# Patient Record
Sex: FEMALE | Race: WHITE | NOT HISPANIC OR LATINO | Employment: OTHER | URBAN - METROPOLITAN AREA
[De-identification: names, ages, dates, MRNs, and addresses within clinical notes are randomized per-mention and may not be internally consistent; named-entity substitution may affect disease eponyms.]

---

## 2023-08-21 ENCOUNTER — HOSPITAL ENCOUNTER (OUTPATIENT)
Dept: DATA CONVERSION | Facility: HOSPITAL | Age: 78
End: 2023-08-21

## 2023-08-21 DIAGNOSIS — C50.412 MALIGNANT NEOPLASM OF UPPER-OUTER QUADRANT OF LEFT FEMALE BREAST (MULTI): ICD-10-CM

## 2023-12-18 DIAGNOSIS — C50.919 MALIGNANT NEOPLASM OF BREAST IN FEMALE, ESTROGEN RECEPTOR POSITIVE, UNSPECIFIED LATERALITY, UNSPECIFIED SITE OF BREAST (MULTI): Primary | ICD-10-CM

## 2023-12-18 DIAGNOSIS — Z17.0 MALIGNANT NEOPLASM OF BREAST IN FEMALE, ESTROGEN RECEPTOR POSITIVE, UNSPECIFIED LATERALITY, UNSPECIFIED SITE OF BREAST (MULTI): Primary | ICD-10-CM

## 2023-12-27 ENCOUNTER — APPOINTMENT (OUTPATIENT)
Dept: HEMATOLOGY/ONCOLOGY | Facility: CLINIC | Age: 78
End: 2023-12-27
Payer: MEDICARE

## 2024-01-03 ENCOUNTER — ANCILLARY PROCEDURE (OUTPATIENT)
Dept: RADIOLOGY | Facility: CLINIC | Age: 79
End: 2024-01-03
Payer: MEDICARE

## 2024-01-03 ENCOUNTER — APPOINTMENT (OUTPATIENT)
Dept: HEMATOLOGY/ONCOLOGY | Facility: CLINIC | Age: 79
End: 2024-01-03
Payer: MEDICARE

## 2024-01-03 DIAGNOSIS — C50.412 MALIGNANT NEOPLASM OF UPPER-OUTER QUADRANT OF LEFT FEMALE BREAST (MULTI): Primary | ICD-10-CM

## 2024-01-03 PROCEDURE — 71260 CT THORAX DX C+: CPT

## 2024-01-03 PROCEDURE — 2550000001 HC RX 255 CONTRASTS: Performed by: INTERNAL MEDICINE

## 2024-01-03 RX ADMIN — IOHEXOL 50 ML: 350 INJECTION, SOLUTION INTRAVENOUS at 13:42

## 2024-02-14 ENCOUNTER — APPOINTMENT (OUTPATIENT)
Dept: HEMATOLOGY/ONCOLOGY | Facility: CLINIC | Age: 79
End: 2024-02-14
Payer: MEDICARE

## 2024-02-14 PROBLEM — Z85.3 PERSONAL HISTORY OF MALIGNANT NEOPLASM OF BREAST: Status: ACTIVE | Noted: 2023-01-01

## 2024-02-14 PROBLEM — E11.22 TYPE 2 DIABETES MELLITUS WITH DIABETIC CHRONIC KIDNEY DISEASE (MULTI): Status: ACTIVE | Noted: 2023-05-25

## 2024-02-14 PROBLEM — I69.354 HEMIPLGA FOLLOWING CEREBRAL INFRC AFFECTING LEFT NONDOM SIDE (MULTI): Status: ACTIVE | Noted: 2023-01-01

## 2024-02-14 PROBLEM — I50.9 CONGESTIVE HEART FAILURE (MULTI): Status: ACTIVE | Noted: 2021-10-25

## 2024-02-14 PROBLEM — Z90.12 ACQUIRED ABSENCE OF LEFT BREAST AND NIPPLE: Status: ACTIVE | Noted: 2023-01-01

## 2024-02-14 PROBLEM — M54.50 CHRONIC LOW BACK PAIN: Status: ACTIVE | Noted: 2023-05-25

## 2024-02-14 PROBLEM — R06.00 DYSPNEA: Status: ACTIVE | Noted: 2024-02-14

## 2024-02-14 PROBLEM — M19.90 ARTHRITIS: Status: ACTIVE | Noted: 2024-02-14

## 2024-02-14 PROBLEM — M19.90 DEGENERATIVE JOINT DISEASE: Status: ACTIVE | Noted: 2024-02-14

## 2024-02-14 PROBLEM — H53.9 UNSPECIFIED VISUAL DISTURBANCE: Status: ACTIVE | Noted: 2023-05-25

## 2024-02-14 PROBLEM — I25.2 OLD MYOCARDIAL INFARCTION: Status: ACTIVE | Noted: 2023-01-01

## 2024-02-14 PROBLEM — R07.9 CHEST PAIN: Status: ACTIVE | Noted: 2024-02-14

## 2024-02-14 PROBLEM — M85.80 OSTEOPENIA: Status: ACTIVE | Noted: 2024-02-14

## 2024-02-14 PROBLEM — F01.50 VASCULAR DEMENTIA (MULTI): Status: ACTIVE | Noted: 2024-02-14

## 2024-02-14 PROBLEM — Z90.710 ACQUIRED ABSENCE OF BOTH CERVIX AND UTERUS: Status: ACTIVE | Noted: 2023-01-01

## 2024-02-14 PROBLEM — M48.02 SPINAL STENOSIS IN CERVICAL REGION: Status: ACTIVE | Noted: 2023-05-28

## 2024-02-14 PROBLEM — M19.90 UNSPECIFIED OSTEOARTHRITIS, UNSPECIFIED SITE: Status: ACTIVE | Noted: 2023-01-01

## 2024-02-14 PROBLEM — E11.9 DIABETES MELLITUS WITHOUT COMPLICATION (MULTI): Status: ACTIVE | Noted: 2023-05-28

## 2024-02-14 PROBLEM — E66.9 OBESITY, UNSPECIFIED: Status: ACTIVE | Noted: 2023-05-28

## 2024-02-14 PROBLEM — I63.9 CEREBROVASCULAR ACCIDENT (MULTI): Status: ACTIVE | Noted: 2024-02-14

## 2024-02-14 PROBLEM — M25.531 RIGHT WRIST PAIN: Status: ACTIVE | Noted: 2024-02-14

## 2024-02-14 PROBLEM — I25.10 ARTERIOSCLEROSIS OF CORONARY ARTERY: Status: ACTIVE | Noted: 2021-09-16

## 2024-02-14 PROBLEM — S83.92XD: Status: ACTIVE | Noted: 2023-05-28

## 2024-02-14 PROBLEM — H54.7 UNSPECIFIED VISUAL LOSS: Status: ACTIVE | Noted: 2023-01-01

## 2024-02-14 PROBLEM — N18.30 TYPE 2 DIABETES MELLITUS WITH STAGE 3 CHRONIC KIDNEY DISEASE, WITHOUT LONG-TERM CURRENT USE OF INSULIN (MULTI): Status: ACTIVE | Noted: 2020-09-17

## 2024-02-14 PROBLEM — R29.6 REPEATED FALLS: Status: ACTIVE | Noted: 2023-06-23

## 2024-02-14 PROBLEM — Z92.21 PERSONAL HISTORY OF ANTINEOPLASTIC CHEMOTHERAPY: Status: ACTIVE | Noted: 2023-01-01

## 2024-02-14 PROBLEM — E04.2 MULTINODULAR GOITER: Status: ACTIVE | Noted: 2024-02-14

## 2024-02-14 PROBLEM — R26.0 SENSORY ATAXIC GAIT: Status: ACTIVE | Noted: 2024-02-14

## 2024-02-14 PROBLEM — R41.3 MEMORY LOSS DUE TO MEDICAL CONDITION: Status: ACTIVE | Noted: 2024-02-14

## 2024-02-14 PROBLEM — I25.10 ATHEROSCLEROTIC HEART DISEASE OF NATIVE CORONARY ARTERY WITHOUT ANGINA PECTORIS: Status: ACTIVE | Noted: 2023-05-25

## 2024-02-14 PROBLEM — Z85.3 HX OF BREAST CANCER: Status: ACTIVE | Noted: 2023-05-28

## 2024-02-14 PROBLEM — I69.312: Status: ACTIVE | Noted: 2023-05-28

## 2024-02-14 PROBLEM — K59.09 OTHER CONSTIPATION: Status: ACTIVE | Noted: 2022-12-15

## 2024-02-14 PROBLEM — E11.40 NEUROPATHY, DIABETIC (MULTI): Status: ACTIVE | Noted: 2024-02-14
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
## 2024-02-14 PROBLEM — K21.9 GASTROESOPHAGEAL REFLUX DISEASE: Status: ACTIVE | Noted: 2024-02-14

## 2024-02-14 PROBLEM — I10 PRIMARY HYPERTENSION: Status: ACTIVE | Noted: 2022-09-20

## 2024-02-14 PROBLEM — R06.02 SOBOE (SHORTNESS OF BREATH ON EXERTION): Status: ACTIVE | Noted: 2024-02-14

## 2024-02-14 PROBLEM — F41.8 MIXED ANXIETY AND DEPRESSIVE DISORDER: Status: ACTIVE | Noted: 2024-02-14

## 2024-02-14 PROBLEM — Z51.81 ENCOUNTER FOR THERAPEUTIC DRUG LEVEL MONITORING: Status: ACTIVE | Noted: 2023-07-28

## 2024-02-14 PROBLEM — R53.1 ASTHENIA: Status: ACTIVE | Noted: 2024-02-14

## 2024-02-14 PROBLEM — M62.81 MUSCLE WEAKNESS (GENERALIZED): Status: ACTIVE | Noted: 2023-05-28

## 2024-02-14 PROBLEM — R93.89 ABNORMAL FINDINGS ON DIAGNOSTIC IMAGING OF OTHER SPECIFIED BODY STRUCTURES: Status: ACTIVE | Noted: 2023-05-17

## 2024-02-14 PROBLEM — K80.20 CHOLELITHIASIS: Status: ACTIVE | Noted: 2024-02-14

## 2024-02-14 PROBLEM — N18.30 CKD (CHRONIC KIDNEY DISEASE) STAGE 3, GFR 30-59 ML/MIN (MULTI): Status: ACTIVE | Noted: 2021-06-20

## 2024-02-14 PROBLEM — C34.90 MALIGNANT NEOPLASM OF LUNG (MULTI): Status: ACTIVE | Noted: 2023-07-31

## 2024-02-14 PROBLEM — Z79.02 LONG TERM (CURRENT) USE OF ANTITHROMBOTICS/ANTIPLATELETS: Status: ACTIVE | Noted: 2023-01-01

## 2024-02-14 PROBLEM — N18.9 CHRONIC KIDNEY DISEASE, UNSPECIFIED: Status: ACTIVE | Noted: 2023-05-25

## 2024-02-14 PROBLEM — Z99.3 DEPENDENCE ON WHEELCHAIR: Status: ACTIVE | Noted: 2023-05-25

## 2024-02-14 PROBLEM — Z79.84 LONG TERM (CURRENT) USE OF ORAL HYPOGLYCEMIC DRUGS: Status: ACTIVE | Noted: 2023-06-23

## 2024-02-14 PROBLEM — I89.0 LYMPHEDEMA, NOT ELSEWHERE CLASSIFIED: Status: ACTIVE | Noted: 2023-01-01

## 2024-02-14 PROBLEM — E11.22 TYPE 2 DIABETES MELLITUS WITH STAGE 3 CHRONIC KIDNEY DISEASE, WITHOUT LONG-TERM CURRENT USE OF INSULIN (MULTI): Status: ACTIVE | Noted: 2020-09-17

## 2024-02-14 PROBLEM — R10.13 DYSPEPSIA: Status: ACTIVE | Noted: 2024-02-14

## 2024-02-14 PROBLEM — I69.354 HEMIPLEGIA AND HEMIPARESIS FOLLOWING CEREBRAL INFARCTION AFFECTING LEFT NON-DOMINANT SIDE (MULTI): Status: ACTIVE | Noted: 2023-05-28

## 2024-02-14 PROBLEM — M25.462 EFFUSION, LEFT KNEE: Status: ACTIVE | Noted: 2023-05-28

## 2024-02-14 PROBLEM — I10 BENIGN ESSENTIAL HYPERTENSION: Status: ACTIVE | Noted: 2024-02-14

## 2024-02-14 PROBLEM — I69.311 MEMORY DEFICIT FOLLOWING CEREBRAL INFARCTION: Status: ACTIVE | Noted: 2023-01-01

## 2024-02-14 PROBLEM — R73.09 BLOOD GLUCOSE ABNORMAL: Status: ACTIVE | Noted: 2024-02-14

## 2024-02-14 PROBLEM — G89.29 CHRONIC LOW BACK PAIN: Status: ACTIVE | Noted: 2023-05-25

## 2024-02-14 PROBLEM — Z87.19 PERSONAL HISTORY OF OTHER DISEASES OF THE DIGESTIVE SYSTEM: Status: ACTIVE | Noted: 2022-12-15

## 2024-02-14 PROBLEM — N18.30 CHRONIC KIDNEY DISEASE, STAGE 3 UNSPECIFIED (MULTI): Status: ACTIVE | Noted: 2023-01-01

## 2024-02-14 PROBLEM — U07.1 COVID-19 VIRUS INFECTION: Status: ACTIVE | Noted: 2021-11-22

## 2024-02-14 PROBLEM — Z85.118 PERSONAL HISTORY OF MALIGNANT NEOPLASM OF BRONCHUS AND LUNG: Status: ACTIVE | Noted: 2023-01-01

## 2024-02-14 PROBLEM — I87.303 STASIS EDEMA OF BOTH LOWER EXTREMITIES: Status: ACTIVE | Noted: 2024-02-14

## 2024-02-14 PROBLEM — C50.919 MALIGNANT NEOPLASM OF UNSPECIFIED SITE OF UNSPECIFIED FEMALE BREAST (MULTI): Status: ACTIVE | Noted: 2023-07-28

## 2024-02-14 PROBLEM — Z87.440 PERSONAL HISTORY OF URINARY (TRACT) INFECTIONS: Status: ACTIVE | Noted: 2023-08-30

## 2024-02-14 PROBLEM — Z79.82 LONG TERM (CURRENT) USE OF ASPIRIN: Status: ACTIVE | Noted: 2023-05-25

## 2024-02-14 PROBLEM — E83.52 HYPERCALCEMIA: Status: ACTIVE | Noted: 2023-01-01

## 2024-02-14 PROBLEM — R53.1 WEAKNESS: Status: ACTIVE | Noted: 2024-02-14

## 2024-02-14 PROBLEM — I51.9 HEART DISEASE: Status: ACTIVE | Noted: 2024-02-14

## 2024-02-14 PROBLEM — Z92.3 PERSONAL HISTORY OF IRRADIATION: Status: ACTIVE | Noted: 2023-01-01

## 2024-02-14 PROBLEM — I12.9 HYPERTENSIVE CHRONIC KIDNEY DISEASE WITH STAGE 1 THROUGH STAGE 4 CHRONIC KIDNEY DISEASE, OR UNSPECIFIED CHRONIC KIDNEY DISEASE: Status: ACTIVE | Noted: 2023-05-25

## 2024-02-14 PROBLEM — Z78.9 MEDICAL HOME PATIENT: Status: ACTIVE | Noted: 2024-02-14

## 2024-02-14 PROBLEM — I25.2 HISTORY OF NON-ST ELEVATION MYOCARDIAL INFARCTION (NSTEMI): Status: ACTIVE | Noted: 2018-07-10

## 2024-02-14 PROBLEM — I12.9 HYPERTENSIVE CHRONIC KIDNEY DISEASE W STG 1-4/UNSP CHR KDNY: Status: ACTIVE | Noted: 2023-01-01

## 2024-02-14 PROBLEM — I63.9 STROKE (MULTI): Status: ACTIVE | Noted: 2024-02-14

## 2024-02-14 PROBLEM — K76.0 FATTY LIVER: Status: ACTIVE | Noted: 2023-05-28

## 2024-02-14 PROBLEM — E11.9 DIABETES MELLITUS (MULTI): Status: ACTIVE | Noted: 2024-02-14

## 2024-02-14 PROBLEM — Z91.89 AT RISK FOR BLEEDING: Status: ACTIVE | Noted: 2024-02-14

## 2024-02-14 PROBLEM — N39.0 URINARY TRACT INFECTION, SITE NOT SPECIFIED: Status: ACTIVE | Noted: 2023-05-28

## 2024-02-14 PROBLEM — S83.92XA SPRAIN OF UNSPECIFIED SITE OF LEFT KNEE, INITIAL ENCOUNTER: Status: ACTIVE | Noted: 2023-05-25

## 2024-02-14 RX ORDER — OMEPRAZOLE 40 MG/1
CAPSULE, DELAYED RELEASE ORAL
COMMUNITY
Start: 2023-05-03 | End: 2024-02-16 | Stop reason: ALTCHOICE

## 2024-02-14 RX ORDER — FAMOTIDINE 20 MG/1
TABLET, FILM COATED ORAL
COMMUNITY
Start: 2013-09-19

## 2024-02-14 RX ORDER — FERROUS SULFATE 325(65) MG
325 TABLET ORAL
COMMUNITY
Start: 2022-10-13 | End: 2024-02-16 | Stop reason: ALTCHOICE

## 2024-02-14 RX ORDER — ATORVASTATIN CALCIUM 40 MG/1
1 TABLET, FILM COATED ORAL NIGHTLY
COMMUNITY
Start: 2020-08-20

## 2024-02-14 RX ORDER — CYCLOBENZAPRINE HCL 5 MG
5 TABLET ORAL EVERY 8 HOURS PRN
COMMUNITY
Start: 2022-09-21

## 2024-02-14 RX ORDER — ANASTROZOLE 1 MG/1
1 TABLET ORAL
COMMUNITY
Start: 2013-06-24 | End: 2024-05-09 | Stop reason: SDUPTHER

## 2024-02-14 RX ORDER — GUAIFENESIN AND DEXTROMETHORPHAN HYDROBROMIDE 10; 100 MG/5ML; MG/5ML
SYRUP ORAL
COMMUNITY
Start: 2023-12-16 | End: 2024-02-16 | Stop reason: ALTCHOICE

## 2024-02-14 RX ORDER — VIT C/E/ZN/COPPR/LUTEIN/ZEAXAN 250MG-90MG
CAPSULE ORAL
COMMUNITY
Start: 2020-08-20 | End: 2024-02-16 | Stop reason: SDUPTHER

## 2024-02-14 RX ORDER — BLOOD-GLUCOSE METER
EACH MISCELLANEOUS
COMMUNITY
Start: 2023-08-04

## 2024-02-14 RX ORDER — GUAIFENESIN 100 MG/5ML
5 SOLUTION ORAL
COMMUNITY

## 2024-02-14 RX ORDER — ASPIRIN 81 MG/1
81 TABLET ORAL
COMMUNITY

## 2024-02-14 RX ORDER — GLIMEPIRIDE 2 MG/1
TABLET ORAL EVERY 12 HOURS
COMMUNITY
End: 2024-02-16 | Stop reason: ALTCHOICE

## 2024-02-14 RX ORDER — NITROGLYCERIN 0.4 MG/1
0.4 TABLET SUBLINGUAL
COMMUNITY
Start: 2020-08-20

## 2024-02-14 RX ORDER — MICONAZOLE NITRATE, ZINC OXIDE, WHITE PETROLATUM 2.5; 150; 813.5 MG/G; MG/G; MG/G
OINTMENT TOPICAL
COMMUNITY
Start: 2013-05-13

## 2024-02-14 RX ORDER — AMLODIPINE BESYLATE 5 MG/1
5 TABLET ORAL DAILY
COMMUNITY

## 2024-02-14 RX ORDER — SOLIFENACIN SUCCINATE 5 MG/1
TABLET, FILM COATED ORAL
COMMUNITY
Start: 2023-05-01 | End: 2024-02-16 | Stop reason: ALTCHOICE

## 2024-02-14 RX ORDER — FUROSEMIDE 20 MG/1
20 TABLET ORAL
COMMUNITY
Start: 2023-04-17

## 2024-02-14 RX ORDER — ATORVASTATIN CALCIUM 80 MG/1
TABLET, FILM COATED ORAL
COMMUNITY
End: 2024-02-16 | Stop reason: ALTCHOICE

## 2024-02-14 RX ORDER — ISOSORBIDE MONONITRATE 30 MG/1
TABLET, EXTENDED RELEASE ORAL EVERY 24 HOURS
COMMUNITY
Start: 2020-08-20

## 2024-02-14 RX ORDER — LANCETS 33 GAUGE
EACH MISCELLANEOUS
COMMUNITY
Start: 2023-08-02

## 2024-02-14 RX ORDER — CLOPIDOGREL BISULFATE 75 MG/1
75 TABLET ORAL DAILY
COMMUNITY

## 2024-02-14 RX ORDER — RANOLAZINE 500 MG/1
500 TABLET, EXTENDED RELEASE ORAL 2 TIMES DAILY
COMMUNITY

## 2024-02-14 RX ORDER — CITALOPRAM 10 MG/1
TABLET ORAL EVERY 24 HOURS
COMMUNITY
End: 2024-02-16 | Stop reason: ALTCHOICE

## 2024-02-14 RX ORDER — DOXYCYCLINE 100 MG/1
100 CAPSULE ORAL 2 TIMES DAILY
COMMUNITY
Start: 2023-12-16 | End: 2024-02-16 | Stop reason: ALTCHOICE

## 2024-02-14 RX ORDER — PRENATAL NO.167/FOLIC ACID/DHA 0.4MG-25MG
TABLET,CHEWABLE ORAL
COMMUNITY
Start: 2023-05-01

## 2024-02-14 RX ORDER — ONDANSETRON 4 MG/1
TABLET, ORALLY DISINTEGRATING ORAL
COMMUNITY

## 2024-02-14 RX ORDER — ALPHA LIPOIC ACID 600 MG
TABLET ORAL
COMMUNITY
Start: 2023-05-01 | End: 2024-02-16 | Stop reason: ALTCHOICE

## 2024-02-14 RX ORDER — SAXAGLIPTIN 5 MG/1
1 TABLET, FILM COATED ORAL DAILY
COMMUNITY
End: 2024-02-16 | Stop reason: ALTCHOICE

## 2024-02-14 RX ORDER — SERTRALINE HYDROCHLORIDE 50 MG/1
50 TABLET, FILM COATED ORAL
COMMUNITY
Start: 2023-07-31

## 2024-02-14 RX ORDER — GLIPIZIDE 5 MG/1
5 TABLET ORAL
COMMUNITY
Start: 2023-07-31

## 2024-02-14 RX ORDER — SENNOSIDES 8.6 MG/1
TABLET ORAL
COMMUNITY

## 2024-02-14 RX ORDER — PANTOPRAZOLE SODIUM 40 MG/1
40 TABLET, DELAYED RELEASE ORAL
COMMUNITY
Start: 2023-10-02

## 2024-02-14 RX ORDER — CITALOPRAM 20 MG/1
1 TABLET, FILM COATED ORAL DAILY
COMMUNITY
Start: 2013-03-12 | End: 2024-02-16 | Stop reason: ALTCHOICE

## 2024-02-14 RX ORDER — EMPAGLIFLOZIN 10 MG/1
10 TABLET, FILM COATED ORAL
COMMUNITY

## 2024-02-14 RX ORDER — METOPROLOL TARTRATE 50 MG/1
50 TABLET ORAL 2 TIMES DAILY
COMMUNITY
Start: 2023-07-03

## 2024-02-16 ENCOUNTER — APPOINTMENT (OUTPATIENT)
Dept: LAB | Facility: CLINIC | Age: 79
End: 2024-02-16
Payer: MEDICARE

## 2024-02-16 ENCOUNTER — OFFICE VISIT (OUTPATIENT)
Dept: HEMATOLOGY/ONCOLOGY | Facility: CLINIC | Age: 79
End: 2024-02-16
Payer: MEDICARE

## 2024-02-16 VITALS
SYSTOLIC BLOOD PRESSURE: 130 MMHG | DIASTOLIC BLOOD PRESSURE: 55 MMHG | TEMPERATURE: 95.9 F | RESPIRATION RATE: 18 BRPM | WEIGHT: 146.16 LBS | OXYGEN SATURATION: 96 % | HEART RATE: 80 BPM

## 2024-02-16 DIAGNOSIS — E83.52 HIGH CALCIUM LEVELS: ICD-10-CM

## 2024-02-16 DIAGNOSIS — Z79.811 USE OF ANASTROZOLE: ICD-10-CM

## 2024-02-16 DIAGNOSIS — Z85.3 PERSONAL HISTORY OF MALIGNANT NEOPLASM OF BREAST: Primary | ICD-10-CM

## 2024-02-16 PROCEDURE — 3078F DIAST BP <80 MM HG: CPT | Performed by: INTERNAL MEDICINE

## 2024-02-16 PROCEDURE — 99214 OFFICE O/P EST MOD 30 MIN: CPT | Performed by: INTERNAL MEDICINE

## 2024-02-16 PROCEDURE — 1126F AMNT PAIN NOTED NONE PRSNT: CPT | Performed by: INTERNAL MEDICINE

## 2024-02-16 PROCEDURE — 3075F SYST BP GE 130 - 139MM HG: CPT | Performed by: INTERNAL MEDICINE

## 2024-02-16 PROCEDURE — 1036F TOBACCO NON-USER: CPT | Performed by: INTERNAL MEDICINE

## 2024-02-16 PROCEDURE — 1159F MED LIST DOCD IN RCRD: CPT | Performed by: INTERNAL MEDICINE

## 2024-02-16 ASSESSMENT — PATIENT HEALTH QUESTIONNAIRE - PHQ9
SUM OF ALL RESPONSES TO PHQ9 QUESTIONS 1 AND 2: 0
2. FEELING DOWN, DEPRESSED OR HOPELESS: NOT AT ALL
1. LITTLE INTEREST OR PLEASURE IN DOING THINGS: NOT AT ALL

## 2024-02-16 ASSESSMENT — COLUMBIA-SUICIDE SEVERITY RATING SCALE - C-SSRS
2. HAVE YOU ACTUALLY HAD ANY THOUGHTS OF KILLING YOURSELF?: NO
1. IN THE PAST MONTH, HAVE YOU WISHED YOU WERE DEAD OR WISHED YOU COULD GO TO SLEEP AND NOT WAKE UP?: NO
6. HAVE YOU EVER DONE ANYTHING, STARTED TO DO ANYTHING, OR PREPARED TO DO ANYTHING TO END YOUR LIFE?: NO

## 2024-02-16 ASSESSMENT — ENCOUNTER SYMPTOMS
LOSS OF SENSATION IN FEET: 1
OCCASIONAL FEELINGS OF UNSTEADINESS: 1
DEPRESSION: 0

## 2024-02-16 ASSESSMENT — PAIN SCALES - GENERAL: PAINLEVEL: 0-NO PAIN

## 2024-02-16 NOTE — PROGRESS NOTES
Patient notified with lab results noted from 12/28/2017  Kylah Chaudhary RN - Triage  [facility]     Pt seen in office today for a follow up visit with  Dr. Toribio for management of her breast cancer  She is  without complaints today and denies pain.     Medications, pharmacy preference and allergies were reviewed with patient and updated in the medical record.     Per orders, she is to hold her calcium supplements as her last calcium level was elevated. She will have labs obtained in 2 weeks and will follow up in office in 6 months.    Our contact information was given to patient and they were encouraged to contact us with any questions or concerns.     Patient verbalized understanding and agreement regarding discussed information via verbal feedback. Pt escorted to scheduling.

## 2024-02-18 NOTE — PROGRESS NOTES
DIVISION OF MEDICAL ONCOLOGY    Patient ID: Milli Cordero is a 78 y.o. female.  MRN: [id number]  : 1945  The patient presents to clinic today for her history of metastatic ER+/Her2- breast cancer.    Diagnostic/Therapeutic History:  2012 - presented with a palpable breast mass   Left mastectomy with sentinel lymph node and axillary dissection. A 6.0 cm tumor was removed. It was an invasive grade 3 ductal carcinoma with ductal carcinoma in situ. One sentinel lymph node was involved and one out of 14 lymph nodes were involved with  extracapsular extension.   At the same time had a right breast reduction and implant was put on her left side, which eventually was removed because of infection.   Staging workup revealed three prominent lymph nodes from the AP window, along with 11.0 x 7.0 mm indeterminant solitary apical nodule in the left lung and a 2.4 x 1.7 mm prominent george hepatic lymph node.   PET scan showed no uptake.   Biopsy of the periportal lymph came back reactive.   MUGA scan was not normal and she received adjuvant chemotherapy with Taxotere and cyclophosphamide finishing four cycles.   After finishing chemotherapy she was found to have enlarging mass in the mediastinum that eventually grew to 6.7 x 2.6 cm. This was biopsied on three separate occasions. The first one showed necrotizing granulomatous inflammatory features. Acid fast stain  and fungal organism staining were negative. Second biopsy was done for culture It showed parenchymal tissue with acute suppurative and necrotizing granulomatous inflammation. The third biopsy was done as the cultures were missed before. At this time there  were some atypical cells that were suspicious for metastatic carcinoma, although, the specimen was quite limited. CT at this time showed slight decrease in size of the mass.   She proceeded to receive radiation therapy to the chest wall and axilla completing last 2013.   2013 -  started Arimidex     Paramediastinal mass  Followup PET/CT showed a decrease in size of the paramediastinal mass in the left upper lobe.   CT scan April 2015 showed interval decrease in size of the mediastinal mass.   CT scan February 2016 shows no change in the 4.5 x 1.5 mediastinal lesion.   PET scan in October 2019, showed no abnormal area of hypermetabolic activity. Hypermetabolic activity is seen in the mediastinal area has now resolved.   CT scan April 2019 stable postsurgical changes and post-radiation changes of the left upper lobe  CT scan February 2021 AP window soft tissue mass lesion as seen on the remote CT appearing  slightly less conspicuous    History of Present Illness (HPI)/Interval History:  Milli Cordero presents today for follow-up on anastrozole.  She is accompanied by her daughter-in-law.  She continues to tolerate anastrozole without any notable side effects.  No new dyspnea, cough, bone pain, abdominal pain, nausea/vomiting.    Review of Systems:  14-point ROS otherwise negative, as per HPI/Interval History.    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    Vascular dementia (CMS/HCC)    Urinary tract infection, site not specified    Type 2 diabetes mellitus with stage 3 chronic kidney disease, without long-term current use of insulin (CMS/HCC)    Diabetes mellitus (CMS/HCC)    Diabetes mellitus without complication (CMS/HCC)    Stasis edema of both lower extremities    Sprain of unspecified site of left knee, subsequent encounter    Spinal stenosis of lumbar region    Spinal stenosis in  cervical region    Sensory ataxic gait    Right wrist pain    Osteopenia    Old myocardial infarction    Obesity, unspecified    Neuropathy, diabetic (CMS/HCC)    Muscle weakness (generalized)    Multinodular goiter    Mixed anxiety and depressive disorder    Memory loss due to medical condition    Memory deficit following cerebral infarction    Lymphedema, not elsewhere classified    Hyperlipidemia    Hypercalcemia    Hx of breast cancer    History of non-ST elevation myocardial infarction (NSTEMI)    Hemiplegia and hemiparesis following cerebral infarction affecting left non-dominant side (CMS/HCC)    Heart disease    Gastroesophageal reflux disease    Fatty liver    Effusion, left knee    SOBOE (shortness of breath on exertion)    Dyspnea    Dyspepsia    COVID-19 virus infection    Congestive heart failure (CMS/HCC)    Lumbago    Chronic low back pain    CKD (chronic kidney disease) stage 3, GFR 30-59 ml/min (CMS/HCC)    Chronic kidney disease, stage 3 unspecified (CMS/HCC)    Cholelithiasis    Chest pain    Stroke (CMS/HCC)    Cerebrovascular accident (CMS/HCC)    Malignant neoplasm of lung (CMS/HCC)    Breast cancer (CMS/HCC)    Blood glucose abnormal    Primary hypertension    Benign essential hypertension    Asthenia    Weakness    Unspecified osteoarthritis, unspecified site    Degenerative joint disease    Arthritis    Arteriosclerosis of coronary artery    Acquired absence of left breast and nipple    Acquired absence of both cervix and uterus    Unspecified visual loss    Unspecified visual disturbance    Unspecified fall, subsequent encounter    Type 2 diabetes mellitus with diabetic chronic kidney disease (CMS/HCC)    Abnormal findings on diagnostic imaging of other specified body structures    Atherosclerotic heart disease of native coronary artery without angina pectoris    At risk for bleeding    Body mass index (BMI) 28.0-28.9, adult    Dependence on wheelchair    Sprain of unspecified site of left  knee, initial encounter    Repeated falls    Personal history of urinary (tract) infections    Personal history of other diseases of the digestive system    Personal history of malignant neoplasm of breast    Personal history of irradiation    Personal history of antineoplastic chemotherapy    Medical home patient    Other constipation    Other ovarian cyst, unspecified side    Personal history of malignant neoplasm of bronchus and lung    Malignant neoplasm of unspecified site of unspecified female breast (CMS/HCC)    Long term (current) use of aspirin    Long term (current) use of oral hypoglycemic drugs    Hypertensive chronic kidney disease w stg 1-4/unsp chr kdny    Long term (current) use of antithrombotics/antiplatelets    Hypertensive chronic kidney disease with stage 1 through stage 4 chronic kidney disease, or unspecified chronic kidney disease    Hemiplga following cerebral infrc affecting left nondom side (CMS/HCC)    Encounter for therapeutic drug level monitoring    Chronic kidney disease, unspecified        Past Surgical History:   Procedure Laterality Date    CT GUIDED IMAGING FOR NEEDLE PLACEMENT  1/4/2013    CT GUIDED IMAGING FOR NEEDLE PLACEMENT [facility]    CT GUIDED IMAGING FOR NEEDLE PLACEMENT  4/8/2013    CT GUIDED IMAGING FOR NEEDLE PLACEMENT [facility]    CT GUIDED IMAGING FOR NEEDLE PLACEMENT  4/30/2013    CT GUIDED IMAGING FOR NEEDLE PLACEMENT [facility]    CT GUIDED PLEURA PERCUTANEOUS BIOPSY  5/24/2013    CT GUIDED PLEURA PERCUTANEOUS BIOPSY 5/24/2013 [facility]    HYSTERECTOMY  09/26/2013    Hysterectomy    MASTECTOMY, PARTIAL  01/22/2013    Breast Surgery Partial Mastectomy    MR HEAD ANGIO WO IV CONTRAST  4/2/2018    MR HEAD ANGIO WO IV CONTRAST [facility]    SMALL INTESTINE SURGERY  09/26/2013    Small Bowel Resection       Social History     Socioeconomic History    Marital status:      Spouse name: None    Number of children:  None    Years of education: None    Highest education level: None   Occupational History    None   Tobacco Use    Smoking status: Never    Smokeless tobacco: Never   Vaping Use    Vaping Use: Never used   Substance and Sexual Activity    Alcohol use: Never    Drug use: Never    Sexual activity: None   Other Topics Concern    None   Social History Narrative    None     Social Determinants of Health     Financial Resource Strain: Not on file   Food Insecurity: Not on file   Transportation Needs: Not on file   Physical Activity: Not on file   Stress: Not on file   Social Connections: Not on file   Intimate Partner Violence: Not on file   Housing Stability: Not on file       No family history on file.    Allergies:   Allergies   Allergen Reactions    Lisinopril Cough    Oxycodone-Acetaminophen Cardiac arrhythmia/arrest    Propoxyphene Shortness of breath    Propoxyphene N-Acetaminophen Shortness of breath    Sertraline Anxiety    Diazepam Other     Pt states she feels palpitations/ Tachycardic    Lovastatin Other    Oxycodone Other    Conj Estrog-Medroxyprogest Ace Other and GI Upset         Current Outpatient Medications:     amLODIPine (Norvasc) 5 mg tablet, Take 1 tablet (5 mg) by mouth once daily., Disp: , Rfl:     anastrozole (Arimidex) 1 mg tablet, Take 1 tablet (1 mg total) by mouth once daily., Disp: , Rfl:     aspirin 81 mg EC tablet, Take 1 tablet (81 mg) by mouth once daily., Disp: , Rfl:     atorvastatin (Lipitor) 40 mg tablet, Take 1 tablet (40 mg) by mouth once daily at bedtime., Disp: , Rfl:     blood sugar diagnostic (OneTouch Verio test strips) strip, Test glucose once a day, Disp: , Rfl:     CALCIUM CARBONATE-VITAMIN D3 ORAL, Take 1 tablet by mouth once daily., Disp: , Rfl:     cyclobenzaprine (Flexeril) 5 mg tablet, Take 1 tablet (5 mg) by mouth every 8 hours if needed., Disp: , Rfl:     famotidine (Pepcid) 20 mg tablet, Take by mouth., Disp: , Rfl:     furosemide (Lasix) 20 mg tablet, Take 1 tablet  (20 mg) by mouth once daily., Disp: , Rfl:     guaiFENesin (Robitussin) 100 mg/5 mL syrup, Take 5 mL by mouth., Disp: , Rfl:     isosorbide mononitrate ER (Imdur) 30 mg 24 hr tablet, once every 24 hours., Disp: , Rfl:     Jardiance 10 mg, Take 1 tablet (10 mg) by mouth once daily with breakfast., Disp: , Rfl:     metoprolol tartrate (Lopressor) 50 mg tablet, Take 1 tablet by mouth twice a day., Disp: , Rfl:     miconazole nitrate-zinc ox-pet (Vusion) 0.25-15-81.35 % ointment ointment, Vusion 0.25-15-81.35 % External Ointment  Quantity: 50  Refills: 0      Start : 13-May-2013  Active, Disp: , Rfl:     nitroglycerin (Nitrostat) 0.4 mg SL tablet, Place 1 tablet (0.4 mg) under the tongue., Disp: , Rfl:     ondansetron ODT (Zofran-ODT) 4 mg disintegrating tablet, Take by mouth., Disp: , Rfl:     OneTouch Delica Plus Lancet 33 gauge misc, USE TO CHECK GLUCOSE ONCE DAILY, Disp: , Rfl:     pantoprazole (ProtoNix) 40 mg EC tablet, Take 1 tablet (40 mg) by mouth once daily., Disp: , Rfl:     prenatal no.167-folic acid-dha (One-A-Day Prenatal) 400 mcg- 25 mg tablet,chewable, 1 tablet DAILY (route: oral), Disp: , Rfl:     ranolazine (Ranexa) 500 mg 12 hr tablet, Take 1 tablet (500 mg) by mouth 2 times a day., Disp: , Rfl:     sennosides (Senokot) 8.6 mg tablet, Take by mouth., Disp: , Rfl:     sertraline (Zoloft) 50 mg tablet, Take 1 tablet (50 mg) by mouth once daily., Disp: , Rfl:     clopidogrel (Plavix) 75 mg tablet, Take 1 tablet (75 mg) by mouth once daily., Disp: , Rfl:     glipiZIDE (Glucotrol) 5 mg tablet, Take 1 tablet (5 mg) by mouth., Disp: , Rfl:      Objective    BSA: There is no height or weight on file to calculate BSA.  /55 (BP Location: Right arm, Patient Position: Sitting, BP Cuff Size: Adult long)   Pulse 80   Temp 35.5 °C (95.9 °F) (Temporal)   Resp 18   Wt 66.3 kg (146 lb 2.6 oz)   SpO2 96%     ECOG Performance Status:  The ECOG performance scale today is ECOG: 3- Capable of only limited  selfcare, confined to bed/chair > 50% of waking hrs.    Physical Exam  Vitals and nursing note reviewed.   Constitutional:       General: She is not in acute distress.     Appearance: Normal appearance. She is not ill-appearing.      Comments: Resting comfortably in wheelchair.   HENT:      Head: Normocephalic and atraumatic.   Eyes:      General: No scleral icterus.     Conjunctiva/sclera: Conjunctivae normal.   Cardiovascular:      Rate and Rhythm: Normal rate and regular rhythm.      Heart sounds: No murmur heard.  Pulmonary:      Effort: Pulmonary effort is normal. No respiratory distress.      Breath sounds: Normal breath sounds.   Musculoskeletal:      Cervical back: Neck supple. No rigidity or tenderness.   Lymphadenopathy:      Cervical: No cervical adenopathy.   Neurological:      General: No focal deficit present.      Mental Status: She is alert and oriented to person, place, and time. Mental status is at baseline.      Comments: Some memory issues noted.   Psychiatric:         Mood and Affect: Mood normal.         Behavior: Behavior normal.         Laboratory Data:  Lab Results   Component Value Date    WBC 9.0 06/29/2023    HGB 12.1 06/29/2023    HCT 37.3 06/29/2023    MCV 91.6 06/29/2023     06/29/2023       Chemistry    Lab Results   Component Value Date/Time     06/29/2023 0640    K 3.2 (L) 06/29/2023 0640     06/29/2023 0640    CO2 26 06/29/2023 0640    BUN 14 06/29/2023 0640    CREATININE 1.1 06/29/2023 0640    Lab Results   Component Value Date/Time    CALCIUM 9.8 06/29/2023 0640    ALKPHOS 89 05/25/2023 0601    AST 15 05/25/2023 0601    ALT 12 05/25/2023 0601    BILITOT 0.4 05/25/2023 0601        Radiology:  === 01/03/24 ===    CT CHEST W IV CONTRAST    - Impression -  1. Stable appearing lesion within the AP window with scattered  subcentimeter lymph nodes in the mediastinum unchanged.    2. Postradiation changes within the left midlung anteriorly.    3. No noncalcified  pulmonary nodularity demonstrated      MACRO:  None    Signed by: Sia Oneal 1/3/2024 3:12 PM  Dictation workstation:   [id number]       Assessment/Plan:  Milli Cordero is a 78 y.o. female with a history of ER+/Her2- breast cancer, who presents today for follow-up evaluation.    - CT Chest with stable AP window mass, no new lesions.  - Continue anastrozole 1 mg daily. Tolerating well.  - Plan to decrease frequency of CT scans to once per year (unless prompted by symptoms or other concerns)  - Mild hypercalcemia noted on labs from PCP (Ca level 10.9). I asked her to stop Ca supplements and have this rechecked in 2 weeks. Will call with results.  - H/o osteoporosis. Declined treatment. On vitamin D supplementation.    Disposition.  - RTC 6 months. Repeat labs in 2 weeks.  - She was given our contact information and instructed to call with concerns/questions in the interim.    Orders Placed This Encounter   Procedures    Comprehensive Metabolic Panel      Low Toribio MD  Hematology and Medical Oncology  [facility]

## 2024-05-09 ENCOUNTER — TELEPHONE (OUTPATIENT)
Dept: ADMISSION | Facility: HOSPITAL | Age: 79
End: 2024-05-09
Payer: MEDICARE

## 2024-05-09 DIAGNOSIS — C50.919 MALIGNANT NEOPLASM OF BREAST IN FEMALE, ESTROGEN RECEPTOR POSITIVE, UNSPECIFIED LATERALITY, UNSPECIFIED SITE OF BREAST (MULTI): Primary | ICD-10-CM

## 2024-05-09 DIAGNOSIS — Z17.0 MALIGNANT NEOPLASM OF BREAST IN FEMALE, ESTROGEN RECEPTOR POSITIVE, UNSPECIFIED LATERALITY, UNSPECIFIED SITE OF BREAST (MULTI): Primary | ICD-10-CM

## 2024-05-09 RX ORDER — ANASTROZOLE 1 MG/1
1 TABLET ORAL
Qty: 90 TABLET | Refills: 3 | Status: SHIPPED | OUTPATIENT
Start: 2024-05-09 | End: 2025-05-09

## 2024-05-09 NOTE — TELEPHONE ENCOUNTER
Reina at [facility] pharmacy called and said patient needs a refill sent in for Anastrozole 1mg per her pharmacy. Please send in RX. Messaged team.

## 2024-08-14 ENCOUNTER — APPOINTMENT (OUTPATIENT)
Dept: HEMATOLOGY/ONCOLOGY | Facility: CLINIC | Age: 79
End: 2024-08-14
Payer: MEDICARE

## 2024-10-15 NOTE — PROGRESS NOTES
DIVISION OF MEDICAL ONCOLOGY    Patient ID: Milli Cordero is a 79 y.o. female.  MRN: [id number]  : 1945  The patient presents to clinic today for her history of metastatic ER+/Her2- breast cancer.    Diagnostic/Therapeutic History:  2012 - presented with a palpable breast mass   Left mastectomy with sentinel lymph node and axillary dissection. A 6.0 cm tumor was removed. It was an invasive grade 3 ductal carcinoma with ductal carcinoma in situ. One sentinel lymph node was involved and one out of 14 lymph nodes were involved with  extracapsular extension.   At the same time had a right breast reduction and implant was put on her left side, which eventually was removed because of infection.   Staging workup revealed three prominent lymph nodes from the AP window, along with 11.0 x 7.0 mm indeterminant solitary apical nodule in the left lung and a 2.4 x 1.7 mm prominent george hepatic lymph node.   PET scan showed no uptake.   Biopsy of the periportal lymph came back reactive.   MUGA scan was not normal and she received adjuvant chemotherapy with Taxotere and cyclophosphamide finishing four cycles.   After finishing chemotherapy she was found to have enlarging mass in the mediastinum that eventually grew to 6.7 x 2.6 cm. This was biopsied on three separate occasions. The first one showed necrotizing granulomatous inflammatory features. Acid fast stain  and fungal organism staining were negative. Second biopsy was done for culture It showed parenchymal tissue with acute suppurative and necrotizing granulomatous inflammation. The third biopsy was done as the cultures were missed before. At this time there  were some atypical cells that were suspicious for metastatic carcinoma, although, the specimen was quite limited. CT at this time showed slight decrease in size of the mass.   She proceeded to receive radiation therapy to the chest wall and axilla completing last 2013.   2013 -  started Arimidex     Paramediastinal mass  Followup PET/CT showed a decrease in size of the paramediastinal mass in the left upper lobe.   CT scan April 2015 showed interval decrease in size of the mediastinal mass.   CT scan February 2016 shows no change in the 4.5 x 1.5 mediastinal lesion.   PET scan in October 2019, showed no abnormal area of hypermetabolic activity. Hypermetabolic activity is seen in the mediastinal area has now resolved.   CT scan April 2019 stable postsurgical changes and post-radiation changes of the left upper lobe  CT scan February 2021 AP window soft tissue mass lesion as seen on the remote CT appearing  slightly less conspicuous    History of Present Illness (HPI)/Interval History:  Milli Cordero presents today for follow-up on anastrozole.  She is accompanied by her daughter-in-law.  She continues to tolerate anastrozole without any notable side effects.  No new dyspnea, cough, bone pain, abdominal pain, nausea/vomiting.    Review of Systems:  14-point ROS otherwise negative, as per HPI/Interval History.    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    Vascular dementia    Urinary tract infection, site not specified    Type 2 diabetes mellitus with stage 3 chronic kidney disease, without long-term current use of insulin (Multi)    Diabetes mellitus (Multi)    Diabetes mellitus without complication (Multi)    Stasis edema of both lower extremities    Sprain of unspecified site of left knee, subsequent encounter    Spinal stenosis of lumbar region    Spinal stenosis in cervical region     Sensory ataxic gait    Right wrist pain    Osteopenia    Old myocardial infarction    Obesity, unspecified    Neuropathy, diabetic (Multi)    Muscle weakness (generalized)    Multinodular goiter    Mixed anxiety and depressive disorder    Memory loss due to medical condition    Memory deficit following cerebral infarction    Lymphedema, not elsewhere classified    Hyperlipidemia    Hypercalcemia    Hx of breast cancer    History of non-ST elevation myocardial infarction (NSTEMI)    Hemiplegia and hemiparesis following cerebral infarction affecting left non-dominant side (Multi)    Heart disease    Gastroesophageal reflux disease    Fatty liver    Effusion, left knee    SOBOE (shortness of breath on exertion)    Dyspnea    Dyspepsia    COVID-19 virus infection    Congestive heart failure    Lumbago    Chronic low back pain    CKD (chronic kidney disease) stage 3, GFR 30-59 ml/min (Multi)    Chronic kidney disease, stage 3 unspecified (Multi)    Cholelithiasis    Chest pain    Stroke (Multi)    Cerebrovascular accident (Multi)    Malignant neoplasm of lung (Multi)    Breast cancer (Multi)    Blood glucose abnormal    Primary hypertension    Benign essential hypertension    Asthenia    Weakness    Unspecified osteoarthritis, unspecified site    Degenerative joint disease    Arthritis    Arteriosclerosis of coronary artery    Acquired absence of left breast and nipple    Acquired absence of both cervix and uterus    Unspecified visual loss    Unspecified visual disturbance    Unspecified fall, subsequent encounter    Type 2 diabetes mellitus with diabetic chronic kidney disease    Abnormal findings on diagnostic imaging of other specified body structures    Atherosclerotic heart disease of native coronary artery without angina pectoris    At risk for bleeding    Body mass index (BMI) 28.0-28.9, adult    Dependence on wheelchair    Sprain of unspecified site of left knee, initial encounter    Repeated falls    Personal  history of urinary (tract) infections    Personal history of other diseases of the digestive system    Personal history of malignant neoplasm of breast    Personal history of irradiation    Personal history of antineoplastic chemotherapy    Medical home patient    Other constipation    Other ovarian cyst, unspecified side    Personal history of malignant neoplasm of bronchus and lung    Malignant neoplasm of unspecified site of unspecified female breast    Long term (current) use of aspirin    Long term (current) use of oral hypoglycemic drugs    Hypertensive chronic kidney disease w stg 1-4/unsp chr kdny    Long term (current) use of antithrombotics/antiplatelets    Hypertensive chronic kidney disease with stage 1 through stage 4 chronic kidney disease, or unspecified chronic kidney disease    Hemiplga following cerebral infrc affecting left nondom side (Multi)    Encounter for therapeutic drug level monitoring    Chronic kidney disease, unspecified        Past Surgical History:   Procedure Laterality Date    CT GUIDED IMAGING FOR NEEDLE PLACEMENT  1/4/2013    CT GUIDED IMAGING FOR NEEDLE PLACEMENT [facility]    CT GUIDED IMAGING FOR NEEDLE PLACEMENT  4/8/2013    CT GUIDED IMAGING FOR NEEDLE PLACEMENT [facility]    CT GUIDED IMAGING FOR NEEDLE PLACEMENT  4/30/2013    CT GUIDED IMAGING FOR NEEDLE PLACEMENT [facility]    CT GUIDED PLEURA PERCUTANEOUS BIOPSY  5/24/2013    CT GUIDED PLEURA PERCUTANEOUS BIOPSY 5/24/2013 [facility]    HYSTERECTOMY  09/26/2013    Hysterectomy    MASTECTOMY, PARTIAL  01/22/2013    Breast Surgery Partial Mastectomy    MR HEAD ANGIO WO IV CONTRAST  4/2/2018    MR HEAD ANGIO WO IV CONTRAST [facility]    SMALL INTESTINE SURGERY  09/26/2013    Small Bowel Resection       Social History     Socioeconomic History    Marital status:    Tobacco Use    Smoking status: Never    Smokeless tobacco: Never   Vaping Use    Vaping status: Never Used    Substance and Sexual Activity    Alcohol use: Never    Drug use: Never     Social Determinants of Health     Financial Resource Strain: Low Risk  (4/11/2024)    Received from [facility]    Overall Financial Resource Strain (CARDIA)     Difficulty of Paying Living Expenses: Not hard at all   Food Insecurity: No Food Insecurity (4/11/2024)    Received from [facility]    Hunger Vital Sign     Worried About Running Out of Food in the Last Year: Never true     Ran Out of Food in the Last Year: Never true   Transportation Needs: No Transportation Needs (4/11/2024)    Received from [facility]    PRAPARE - Transportation     Lack of Transportation (Medical): No     Lack of Transportation (Non-Medical): No   Physical Activity: Inactive (4/11/2024)    Received from [facility]    Exercise Vital Sign     Days of Exercise per Week: 0 days     Minutes of Exercise per Session: 0 min   Stress: Patient Declined (4/11/2024)    [facility]     Feeling of Stress : Patient declined   Social Connections: Socially Isolated (4/11/2024)    Received from [facility]    Social Connection and Isolation Panel [NHANES]     Frequency of Communication with Friends and Family: Never     Frequency of Social Gatherings with Friends and Family: Never     Attends Synagogue Services: Never     Active Member of Clubs or Organizations: No     Attends Club or Organization Meetings: Never     Marital Status:    Housing Stability: Low Risk  (4/11/2024)    Received from [facility]    Housing Stability Vital Sign     Unable to Pay for Housing in the Last Year: No     Number of Places Lived in the Last Year: 1     Unstable Housing in the Last Year: No       No family history on file.    Allergies:    Allergies   Allergen Reactions    Lisinopril Cough    Oxycodone-Acetaminophen Cardiac arrhythmia/arrest    Propoxyphene Shortness of breath    Propoxyphene N-Acetaminophen Shortness of breath    Sertraline Anxiety    Diazepam Other     Pt states she feels palpitations/ Tachycardic    Lovastatin Other    Oxycodone Other    Conj Estrog-Medroxyprogest Ace Other and GI Upset         Current Outpatient Medications:     amLODIPine (Norvasc) 5 mg tablet, Take 1 tablet (5 mg) by mouth once daily., Disp: , Rfl:     anastrozole (Arimidex) 1 mg tablet, Take 1 tablet (1 mg total) by mouth once daily., Disp: 90 tablet, Rfl: 3    aspirin 81 mg EC tablet, Take 1 tablet (81 mg) by mouth once daily., Disp: , Rfl:     atorvastatin (Lipitor) 40 mg tablet, Take 1 tablet (40 mg) by mouth once daily at bedtime., Disp: , Rfl:     blood sugar diagnostic (OneTouch Verio test strips) strip, Test glucose once a day, Disp: , Rfl:     CALCIUM CARBONATE-VITAMIN D3 ORAL, Take 1 tablet by mouth once daily., Disp: , Rfl:     clopidogrel (Plavix) 75 mg tablet, Take 1 tablet (75 mg) by mouth once daily., Disp: , Rfl:     cyclobenzaprine (Flexeril) 5 mg tablet, Take 1 tablet (5 mg) by mouth every 8 hours if needed., Disp: , Rfl:     famotidine (Pepcid) 20 mg tablet, Take by mouth., Disp: , Rfl:     furosemide (Lasix) 20 mg tablet, Take 1 tablet (20 mg) by mouth once daily., Disp: , Rfl:     glipiZIDE (Glucotrol) 5 mg tablet, Take 1 tablet (5 mg) by mouth., Disp: , Rfl:     guaiFENesin (Robitussin) 100 mg/5 mL syrup, Take 5 mL by mouth., Disp: , Rfl:     isosorbide mononitrate ER (Imdur) 30 mg 24 hr tablet, once every 24 hours., Disp: , Rfl:     Jardiance 10 mg, Take 1 tablet (10 mg) by mouth once daily with breakfast., Disp: , Rfl:     metoprolol tartrate (Lopressor) 50 mg tablet, Take 1 tablet by mouth twice a day., Disp: , Rfl:     miconazole nitrate-zinc ox-pet (Vusion) 0.25-15-81.35 % ointment ointment, Vusion 0.25-15-81.35 % External Ointment   Quantity: 50  Refills: 0      Start : 13-May-2013  Active, Disp: , Rfl:     nitroglycerin (Nitrostat) 0.4 mg SL tablet, Place 1 tablet (0.4 mg) under the tongue., Disp: , Rfl:     ondansetron ODT (Zofran-ODT) 4 mg disintegrating tablet, Take by mouth., Disp: , Rfl:     OneTouch Delica Plus Lancet 33 gauge misc, USE TO CHECK GLUCOSE ONCE DAILY, Disp: , Rfl:     pantoprazole (ProtoNix) 40 mg EC tablet, Take 1 tablet (40 mg) by mouth once daily., Disp: , Rfl:     prenatal no.167-folic acid-dha (One-A-Day Prenatal) 400 mcg- 25 mg tablet,chewable, 1 tablet DAILY (route: oral), Disp: , Rfl:     ranolazine (Ranexa) 500 mg 12 hr tablet, Take 1 tablet (500 mg) by mouth 2 times a day., Disp: , Rfl:     sennosides (Senokot) 8.6 mg tablet, Take by mouth., Disp: , Rfl:     sertraline (Zoloft) 50 mg tablet, Take 1 tablet (50 mg) by mouth once daily., Disp: , Rfl:      Objective    BSA: There is no height or weight on file to calculate BSA.  There were no vitals taken for this visit.    ECOG Performance Status:  The ECOG performance scale today is ECOG: 3- Capable of only limited selfcare, confined to bed/chair > 50% of waking hrs.    Physical Exam  Vitals and nursing note reviewed.   Constitutional:       General: She is not in acute distress.     Appearance: Normal appearance. She is not ill-appearing.      Comments: Resting comfortably in wheelchair.   HENT:      Head: Normocephalic and atraumatic.   Eyes:      General: No scleral icterus.     Conjunctiva/sclera: Conjunctivae normal.   Cardiovascular:      Rate and Rhythm: Normal rate and regular rhythm.      Heart sounds: No murmur heard.  Pulmonary:      Effort: Pulmonary effort is normal. No respiratory distress.      Breath sounds: Normal breath sounds.   Musculoskeletal:      Cervical back: Neck supple. No rigidity or tenderness.   Lymphadenopathy:      Cervical: No cervical adenopathy.   Neurological:      General: No focal deficit present.      Mental Status: She is  alert and oriented to person, place, and time. Mental status is at baseline.      Comments: Some memory issues noted.   Psychiatric:         Mood and Affect: Mood normal.         Behavior: Behavior normal.         Laboratory Data:  Lab Results   Component Value Date    WBC 9.0 06/29/2023    HGB 12.1 06/29/2023    HCT 37.3 06/29/2023    MCV 91.6 06/29/2023     06/29/2023       Chemistry    Lab Results   Component Value Date/Time     06/29/2023 0640    K 3.2 (L) 06/29/2023 0640     06/29/2023 0640    CO2 26 06/29/2023 0640    BUN 14 06/29/2023 0640    CREATININE 1.1 06/29/2023 0640    Lab Results   Component Value Date/Time    CALCIUM 9.8 06/29/2023 0640    ALKPHOS 89 05/25/2023 0601    AST 15 05/25/2023 0601    ALT 12 05/25/2023 0601    BILITOT 0.4 05/25/2023 0601        Radiology:  === 01/03/24 ===    CT CHEST W IV CONTRAST    - Impression -  1. Stable appearing lesion within the AP window with scattered  subcentimeter lymph nodes in the mediastinum unchanged.    2. Postradiation changes within the left midlung anteriorly.    3. No noncalcified pulmonary nodularity demonstrated      MACRO:  None    Signed by: Sia Oneal 1/3/2024 3:12 PM  Dictation workstation:   [id number]       Assessment/Plan:  Milli Cordero is a 79 y.o. female with a history of ER+/Her2- breast cancer, who presents today for follow-up evaluation.    - CT Chest with stable AP window mass, no new lesions.  - Continue anastrozole 1 mg daily. Tolerating well.  - Plan to decrease frequency of CT scans to once per year (unless prompted by symptoms or other concerns)  - Mild hypercalcemia noted on labs from PCP (Ca level 10.9). I asked her to stop Ca supplements and have this rechecked in 2 weeks. Will call with results.  - H/o osteoporosis. Declined treatment. On vitamin D supplementation.    Disposition.  - RTC 6 months.  - She was given our contact information and instructed to call with concerns/questions in the  interim.    No orders of the defined types were placed in this encounter.     Marianna Marks PA-C  Hematology and Medical Oncology  [facility]

## 2024-10-16 ENCOUNTER — APPOINTMENT (OUTPATIENT)
Dept: HEMATOLOGY/ONCOLOGY | Facility: CLINIC | Age: 79
End: 2024-10-16
Payer: MEDICARE

## 2025-01-27 NOTE — PROGRESS NOTES
DIVISION OF MEDICAL ONCOLOGY    Patient ID: Milli Cordero is a 79 y.o. female.  MRN: [id number]  : 1945  The patient presents to clinic today for her history of metastatic ER+/Her2- breast cancer.    Diagnostic/Therapeutic History:  2012 - presented with a palpable breast mass   Left mastectomy with sentinel lymph node and axillary dissection. A 6.0 cm tumor was removed. It was an invasive grade 3 ductal carcinoma with ductal carcinoma in situ. One sentinel lymph node was involved and one out of 14 lymph nodes were involved with  extracapsular extension.   At the same time had a right breast reduction and implant was put on her left side, which eventually was removed because of infection.   Staging workup revealed three prominent lymph nodes from the AP window, along with 11.0 x 7.0 mm indeterminant solitary apical nodule in the left lung and a 2.4 x 1.7 mm prominent george hepatic lymph node.   PET scan showed no uptake.   Biopsy of the periportal lymph came back reactive.   MUGA scan was not normal and she received adjuvant chemotherapy with Taxotere and cyclophosphamide finishing four cycles.   After finishing chemotherapy she was found to have enlarging mass in the mediastinum that eventually grew to 6.7 x 2.6 cm. This was biopsied on three separate occasions. The first one showed necrotizing granulomatous inflammatory features. Acid fast stain  and fungal organism staining were negative. Second biopsy was done for culture It showed parenchymal tissue with acute suppurative and necrotizing granulomatous inflammation. The third biopsy was done as the cultures were missed before. At this time there  were some atypical cells that were suspicious for metastatic carcinoma, although, the specimen was quite limited. CT at this time showed slight decrease in size of the mass.   She proceeded to receive radiation therapy to the chest wall and axilla completing last 2013.   2013 -  started Arimidex     Paramediastinal mass  Followup PET/CT showed a decrease in size of the paramediastinal mass in the left upper lobe.   CT scan April 2015 showed interval decrease in size of the mediastinal mass.   CT scan February 2016 shows no change in the 4.5 x 1.5 mediastinal lesion.   PET scan in October 2019, showed no abnormal area of hypermetabolic activity. Hypermetabolic activity is seen in the mediastinal area has now resolved.   CT scan April 2019 stable postsurgical changes and post-radiation changes of the left upper lobe  CT scan February 2021 AP window soft tissue mass lesion as seen on the remote CT appearing  slightly less conspicuous    History of Present Illness (HPI)/Interval History:  Milli Cordero presents today for follow-up on anastrozole.  She is accompanied by her daughter, Bridget.   She continues to tolerate anastrozole without any notable side effects.  No new dyspnea, cough, bone pain, abdominal pain, nausea/vomiting.  She wishes she could walk again.     Review of Systems:  14-point ROS otherwise negative, as per HPI/Interval History.    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    Vascular dementia    Urinary tract infection, site not specified    Type 2 diabetes mellitus with stage 3 chronic kidney disease, without long-term current use of insulin (Multi)    Diabetes mellitus (Multi)    Diabetes mellitus without complication (Multi)    Stasis edema of both lower extremities    Sprain of unspecified site of left knee, subsequent encounter    Spinal stenosis of lumbar region    Spinal  stenosis in cervical region    Sensory ataxic gait    Right wrist pain    Osteopenia    Old myocardial infarction    Obesity, unspecified    Neuropathy, diabetic (Multi)    Muscle weakness (generalized)    Multinodular goiter    Mixed anxiety and depressive disorder    Memory loss due to medical condition    Memory deficit following cerebral infarction    Lymphedema, not elsewhere classified    Hyperlipidemia    Hypercalcemia    Hx of breast cancer    History of non-ST elevation myocardial infarction (NSTEMI)    Hemiplegia and hemiparesis following cerebral infarction affecting left non-dominant side (Multi)    Heart disease    Gastroesophageal reflux disease    Fatty liver    Effusion, left knee    SOBOE (shortness of breath on exertion)    Dyspnea    Dyspepsia    COVID-19 virus infection    Congestive heart failure    Lumbago    Chronic low back pain    CKD (chronic kidney disease) stage 3, GFR 30-59 ml/min (Multi)    Chronic kidney disease, stage 3 unspecified (Multi)    Cholelithiasis    Chest pain    Stroke (Multi)    Cerebrovascular accident (Multi)    Malignant neoplasm of lung (Multi)    Breast cancer (Multi)    Blood glucose abnormal    Primary hypertension    Benign essential hypertension    Asthenia    Weakness    Unspecified osteoarthritis, unspecified site    Degenerative joint disease    Arthritis    Arteriosclerosis of coronary artery    Acquired absence of left breast and nipple    Acquired absence of both cervix and uterus    Unspecified visual loss    Unspecified visual disturbance    Unspecified fall, subsequent encounter    Type 2 diabetes mellitus with diabetic chronic kidney disease    Abnormal findings on diagnostic imaging of other specified body structures    Atherosclerotic heart disease of native coronary artery without angina pectoris    At risk for bleeding    Body mass index (BMI) 28.0-28.9, adult    Dependence on wheelchair    Sprain of unspecified site of left knee, initial encounter     Repeated falls    Personal history of urinary (tract) infections    Personal history of other diseases of the digestive system    Personal history of malignant neoplasm of breast    Personal history of irradiation    Personal history of antineoplastic chemotherapy    Medical home patient    Other constipation    Other ovarian cyst, unspecified side    Personal history of malignant neoplasm of bronchus and lung    Malignant neoplasm of unspecified site of unspecified female breast    Long term (current) use of aspirin    Long term (current) use of oral hypoglycemic drugs    Hypertensive chronic kidney disease w stg 1-4/unsp chr kdny    Long term (current) use of antithrombotics/antiplatelets    Hypertensive chronic kidney disease with stage 1 through stage 4 chronic kidney disease, or unspecified chronic kidney disease    Hemiplga following cerebral infrc affecting left nondom side (Multi)    Encounter for therapeutic drug level monitoring    Chronic kidney disease, unspecified        Past Surgical History:   Procedure Laterality Date    CT GUIDED IMAGING FOR NEEDLE PLACEMENT  1/4/2013    CT GUIDED IMAGING FOR NEEDLE PLACEMENT [facility]    CT GUIDED IMAGING FOR NEEDLE PLACEMENT  4/8/2013    CT GUIDED IMAGING FOR NEEDLE PLACEMENT [facility]    CT GUIDED IMAGING FOR NEEDLE PLACEMENT  4/30/2013    CT GUIDED IMAGING FOR NEEDLE PLACEMENT [facility]    CT GUIDED PLEURA PERCUTANEOUS BIOPSY  5/24/2013    CT GUIDED PLEURA PERCUTANEOUS BIOPSY 5/24/2013 [facility]    HYSTERECTOMY  09/26/2013    Hysterectomy    MASTECTOMY, PARTIAL  01/22/2013    Breast Surgery Partial Mastectomy    MR HEAD ANGIO WO IV CONTRAST  4/2/2018    MR HEAD ANGIO WO IV CONTRAST [facility]    SMALL INTESTINE SURGERY  09/26/2013    Small Bowel Resection       Social History     Socioeconomic History    Marital status:    Tobacco Use    Smoking status: Never    Smokeless tobacco: Never   Vaping Use     Vaping status: Never Used   Substance and Sexual Activity    Alcohol use: Never    Drug use: Never     Social Drivers of Health     Financial Resource Strain: Low Risk  (4/11/2024)    Received from [facility]    Overall Financial Resource Strain (CARDIA)     Difficulty of Paying Living Expenses: Not hard at all   Food Insecurity: No Food Insecurity (4/11/2024)    Received from [facility]    Hunger Vital Sign     Worried About Running Out of Food in the Last Year: Never true     Ran Out of Food in the Last Year: Never true   Transportation Needs: No Transportation Needs (4/11/2024)    Received from [facility]    PRAPARE - Transportation     Lack of Transportation (Medical): No     Lack of Transportation (Non-Medical): No   Physical Activity: Inactive (4/11/2024)    Received from [facility]    Exercise Vital Sign     Days of Exercise per Week: 0 days     Minutes of Exercise per Session: 0 min   Stress: Patient Declined (4/11/2024)    Received from [facility] - Occupational Stress Questionnaire     Feeling of Stress : Patient declined   Social Connections: Socially Isolated (4/11/2024)    Received from [facility]    Social Connection and Isolation Panel [NHANES]     Frequency of Communication with Friends and Family: Never     Frequency of Social Gatherings with Friends and Family: Never     Attends Jew Services: Never     Active Member of Clubs or Organizations: No     Attends Club or Organization Meetings: Never     Marital Status:    Housing Stability: Low Risk  (4/11/2024)    Received from [facility]    Housing Stability Vital Sign     Unable to Pay for Housing in the Last Year: No     Number of Places Lived in the Last Year: 1     Unstable Housing in the Last Year: No       No family history  on file.    Allergies:   Allergies   Allergen Reactions    Lisinopril Cough    Oxycodone-Acetaminophen Cardiac arrhythmia/arrest    Propoxyphene Shortness of breath    Propoxyphene N-Acetaminophen Shortness of breath    Sertraline Anxiety    Diazepam Other     Pt states she feels palpitations/ Tachycardic    Lovastatin Other    Oxycodone Other    Conj Estrog-Medroxyprogest Ace Other and GI Upset         Current Outpatient Medications:     amLODIPine (Norvasc) 5 mg tablet, Take 1 tablet (5 mg) by mouth once daily., Disp: , Rfl:     anastrozole (Arimidex) 1 mg tablet, Take 1 tablet (1 mg total) by mouth once daily., Disp: 90 tablet, Rfl: 3    aspirin 81 mg EC tablet, Take 1 tablet (81 mg) by mouth once daily., Disp: , Rfl:     atorvastatin (Lipitor) 40 mg tablet, Take 1 tablet (40 mg) by mouth once daily at bedtime., Disp: , Rfl:     blood sugar diagnostic (OneTouch Verio test strips) strip, Test glucose once a day, Disp: , Rfl:     CALCIUM CARBONATE-VITAMIN D3 ORAL, Take 1 tablet by mouth once daily., Disp: , Rfl:     clopidogrel (Plavix) 75 mg tablet, Take 1 tablet (75 mg) by mouth once daily., Disp: , Rfl:     cyclobenzaprine (Flexeril) 5 mg tablet, Take 1 tablet (5 mg) by mouth every 8 hours if needed., Disp: , Rfl:     famotidine (Pepcid) 20 mg tablet, Take by mouth., Disp: , Rfl:     furosemide (Lasix) 20 mg tablet, Take 1 tablet (20 mg) by mouth once daily., Disp: , Rfl:     glipiZIDE (Glucotrol) 5 mg tablet, Take 1 tablet (5 mg) by mouth., Disp: , Rfl:     guaiFENesin (Robitussin) 100 mg/5 mL syrup, Take 5 mL by mouth., Disp: , Rfl:     isosorbide mononitrate ER (Imdur) 30 mg 24 hr tablet, once every 24 hours., Disp: , Rfl:     Jardiance 10 mg, Take 1 tablet (10 mg) by mouth once daily with breakfast., Disp: , Rfl:     metoprolol tartrate (Lopressor) 50 mg tablet, Take 1 tablet by mouth twice a day., Disp: , Rfl:     miconazole nitrate-zinc ox-pet (Vusion) 0.25-15-81.35 % ointment ointment, Vusion  0.25-15-81.35 % External Ointment  Quantity: 50  Refills: 0      Start : 13-May-2013  Active, Disp: , Rfl:     nitroglycerin (Nitrostat) 0.4 mg SL tablet, Place 1 tablet (0.4 mg) under the tongue., Disp: , Rfl:     ondansetron ODT (Zofran-ODT) 4 mg disintegrating tablet, Take by mouth., Disp: , Rfl:     OneTouch Delica Plus Lancet 33 gauge misc, USE TO CHECK GLUCOSE ONCE DAILY, Disp: , Rfl:     pantoprazole (ProtoNix) 40 mg EC tablet, Take 1 tablet (40 mg) by mouth once daily., Disp: , Rfl:     prenatal no.167-folic acid-dha (One-A-Day Prenatal) 400 mcg- 25 mg tablet,chewable, 1 tablet DAILY (route: oral), Disp: , Rfl:     ranolazine (Ranexa) 500 mg 12 hr tablet, Take 1 tablet (500 mg) by mouth 2 times a day., Disp: , Rfl:     sennosides (Senokot) 8.6 mg tablet, Take by mouth., Disp: , Rfl:     sertraline (Zoloft) 50 mg tablet, Take 1 tablet (50 mg) by mouth once daily., Disp: , Rfl:      Objective    BSA: There is no height or weight on file to calculate BSA.  There were no vitals taken for this visit.    ECOG Performance Status:  The ECOG performance scale today is ECOG: 3- Capable of only limited selfcare, confined to bed/chair > 50% of waking hrs.    Physical Exam  Vitals and nursing note reviewed.   Constitutional:       General: She is awake. She is not in acute distress.     Appearance: Normal appearance. She is not ill-appearing or toxic-appearing.      Comments: Resting comfortably in wheelchair.    HENT:      Head: Normocephalic and atraumatic.      Mouth/Throat:      Mouth: Mucous membranes are moist.      Pharynx: Oropharynx is clear.   Eyes:      General: No scleral icterus.     Conjunctiva/sclera: Conjunctivae normal.      Pupils: Pupils are equal, round, and reactive to light.   Neck:      Trachea: Trachea and phonation normal. No tracheal tenderness.   Cardiovascular:      Rate and Rhythm: Normal rate and regular rhythm.      Pulses: Normal pulses.      Heart sounds: Normal heart sounds. No murmur  heard.  Pulmonary:      Effort: Pulmonary effort is normal. No respiratory distress.      Breath sounds: Normal breath sounds.   Chest:      Chest wall: No mass or deformity.   Breasts:     Right: No swelling, mass, nipple discharge, skin change or tenderness.      Left: Absent.      Comments: S/p left mastectomy without masses  Abdominal:      General: Bowel sounds are normal. There is no distension.      Palpations: Abdomen is soft. There is no mass.      Tenderness: There is no abdominal tenderness. There is no guarding.   Musculoskeletal:         General: Normal range of motion.      Cervical back: Normal range of motion and neck supple. No rigidity or tenderness.   Lymphadenopathy:      Cervical: No cervical adenopathy.      Upper Body:      Right upper body: No supraclavicular, axillary or pectoral adenopathy.      Left upper body: No supraclavicular, axillary or pectoral adenopathy.   Skin:     General: Skin is warm and dry.      Capillary Refill: Capillary refill takes less than 2 seconds.      Findings: No rash.   Neurological:      General: No focal deficit present.      Mental Status: She is alert and oriented to person, place, and time. Mental status is at baseline.      Motor: No weakness.      Comments: Some memory issues noted.   Psychiatric:         Mood and Affect: Mood normal.         Behavior: Behavior normal. Behavior is cooperative.         Thought Content: Thought content normal.         Judgment: Judgment normal.      Comments: Some slight confusion during conversation          Laboratory Data:  Lab Results   Component Value Date    WBC 9.0 06/29/2023    HGB 12.1 06/29/2023    HCT 37.3 06/29/2023    MCV 91.6 06/29/2023     06/29/2023       Chemistry    Lab Results   Component Value Date/Time     06/29/2023 0640    K 3.2 (L) 06/29/2023 0640     06/29/2023 0640    CO2 26 06/29/2023 0640    BUN 14 06/29/2023 0640    CREATININE 1.1 06/29/2023 0640    Lab Results   Component  Value Date/Time    CALCIUM 9.8 06/29/2023 0640    ALKPHOS 89 05/25/2023 0601    AST 15 05/25/2023 0601    ALT 12 05/25/2023 0601    BILITOT 0.4 05/25/2023 0601        Radiology:  === 01/03/24 ===    CT CHEST W IV CONTRAST    - Impression -  1. Stable appearing lesion within the AP window with scattered  subcentimeter lymph nodes in the mediastinum unchanged.    2. Postradiation changes within the left midlung anteriorly.    3. No noncalcified pulmonary nodularity demonstrated      MACRO:  None    Signed by: Sia Oneal 1/3/2024 3:12 PM  Dictation workstation:   [id number]       Assessment/Plan:  Milli Cordero is a 79 y.o. female with a history of ER+/Her2- breast cancer, who presents today for follow-up evaluation.    - CT Chest with stable AP window mass, no new lesions. Updated scan ordered   - Continue anastrozole 1 mg daily. Tolerating well.  - Plan to decrease frequency of CT scans to once per year (unless prompted by symptoms or other concerns)  - Mild hypercalcemia noted on labs from PCP last year. Will check labs today   - H/o osteoporosis. Previously declined treatment, interested in updating DEXA (ordered) and pt information given on fosamax. They will call if interested. On vitamin D supplementation.  - Right screening mammogram ordered   - Monitor confusion closely, daughter in agreement    Disposition.  - RTC 6 months with Marianna Marks PA-C   - She was given our contact information and instructed to call with concerns/questions in the interim.    No orders of the defined types were placed in this encounter.     Marianna Marks PA-C  Hematology and Medical Oncology  [facility]

## 2025-01-29 ENCOUNTER — OFFICE VISIT (OUTPATIENT)
Dept: HEMATOLOGY/ONCOLOGY | Facility: CLINIC | Age: 80
End: 2025-01-29
Payer: MEDICARE

## 2025-01-29 VITALS
SYSTOLIC BLOOD PRESSURE: 180 MMHG | HEIGHT: 59 IN | RESPIRATION RATE: 16 BRPM | BODY MASS INDEX: 30.27 KG/M2 | WEIGHT: 150.13 LBS | TEMPERATURE: 98.2 F | OXYGEN SATURATION: 96 % | DIASTOLIC BLOOD PRESSURE: 93 MMHG | HEART RATE: 72 BPM

## 2025-01-29 DIAGNOSIS — C34.90 MALIGNANT NEOPLASM OF LUNG, UNSPECIFIED LATERALITY, UNSPECIFIED PART OF LUNG (MULTI): Primary | ICD-10-CM

## 2025-01-29 DIAGNOSIS — M81.0 OSTEOPOROSIS, UNSPECIFIED OSTEOPOROSIS TYPE, UNSPECIFIED PATHOLOGICAL FRACTURE PRESENCE: ICD-10-CM

## 2025-01-29 DIAGNOSIS — Z78.0 MENOPAUSE: ICD-10-CM

## 2025-01-29 DIAGNOSIS — Z12.31 SCREENING MAMMOGRAM FOR BREAST CANCER: ICD-10-CM

## 2025-01-29 DIAGNOSIS — Z85.3 PERSONAL HISTORY OF MALIGNANT NEOPLASM OF BREAST: ICD-10-CM

## 2025-01-29 LAB
ALBUMIN SERPL BCP-MCNC: 4 G/DL (ref 3.4–5)
ALP SERPL-CCNC: 106 U/L (ref 33–136)
ALT SERPL W P-5'-P-CCNC: 21 U/L (ref 7–45)
ANION GAP SERPL CALC-SCNC: 13 MMOL/L (ref 10–20)
AST SERPL W P-5'-P-CCNC: 20 U/L (ref 9–39)
BASOPHILS # BLD AUTO: 0.04 X10*3/UL (ref 0–0.1)
BASOPHILS NFR BLD AUTO: 0.4 %
BILIRUB SERPL-MCNC: 0.7 MG/DL (ref 0–1.2)
BUN SERPL-MCNC: 27 MG/DL (ref 6–23)
CALCIUM SERPL-MCNC: 10 MG/DL (ref 8.6–10.3)
CHLORIDE SERPL-SCNC: 106 MMOL/L (ref 98–107)
CO2 SERPL-SCNC: 26 MMOL/L (ref 21–32)
CREAT SERPL-MCNC: 1.13 MG/DL (ref 0.5–1.05)
EGFRCR SERPLBLD CKD-EPI 2021: 50 ML/MIN/1.73M*2
EOSINOPHIL # BLD AUTO: 0.15 X10*3/UL (ref 0–0.4)
EOSINOPHIL NFR BLD AUTO: 1.5 %
ERYTHROCYTE [DISTWIDTH] IN BLOOD BY AUTOMATED COUNT: 14.3 % (ref 11.5–14.5)
GLUCOSE SERPL-MCNC: 221 MG/DL (ref 74–99)
HCT VFR BLD AUTO: 41.4 % (ref 36–46)
HGB BLD-MCNC: 13.8 G/DL (ref 12–16)
IMM GRANULOCYTES # BLD AUTO: 0.04 X10*3/UL (ref 0–0.5)
IMM GRANULOCYTES NFR BLD AUTO: 0.4 % (ref 0–0.9)
LYMPHOCYTES # BLD AUTO: 1.36 X10*3/UL (ref 0.8–3)
LYMPHOCYTES NFR BLD AUTO: 13.6 %
MCH RBC QN AUTO: 30.9 PG (ref 26–34)
MCHC RBC AUTO-ENTMCNC: 33.3 G/DL (ref 32–36)
MCV RBC AUTO: 93 FL (ref 80–100)
MONOCYTES # BLD AUTO: 0.42 X10*3/UL (ref 0.05–0.8)
MONOCYTES NFR BLD AUTO: 4.2 %
NEUTROPHILS # BLD AUTO: 7.96 X10*3/UL (ref 1.6–5.5)
NEUTROPHILS NFR BLD AUTO: 79.9 %
NRBC BLD-RTO: 0 /100 WBCS (ref 0–0)
PLATELET # BLD AUTO: 216 X10*3/UL (ref 150–450)
POTASSIUM SERPL-SCNC: 4 MMOL/L (ref 3.5–5.3)
PROT SERPL-MCNC: 6.7 G/DL (ref 6.4–8.2)
RBC # BLD AUTO: 4.46 X10*6/UL (ref 4–5.2)
SODIUM SERPL-SCNC: 141 MMOL/L (ref 136–145)
WBC # BLD AUTO: 10 X10*3/UL (ref 4.4–11.3)

## 2025-01-29 PROCEDURE — 3077F SYST BP >= 140 MM HG: CPT

## 2025-01-29 PROCEDURE — 1159F MED LIST DOCD IN RCRD: CPT

## 2025-01-29 PROCEDURE — 3080F DIAST BP >= 90 MM HG: CPT

## 2025-01-29 PROCEDURE — 99215 OFFICE O/P EST HI 40 MIN: CPT

## 2025-01-29 PROCEDURE — 85025 COMPLETE CBC W/AUTO DIFF WBC: CPT

## 2025-01-29 PROCEDURE — 84075 ASSAY ALKALINE PHOSPHATASE: CPT

## 2025-01-29 PROCEDURE — 1126F AMNT PAIN NOTED NONE PRSNT: CPT

## 2025-01-29 ASSESSMENT — PAIN SCALES - GENERAL: PAINLEVEL_OUTOF10: 0-NO PAIN

## 2025-01-29 NOTE — PROGRESS NOTES
Patient here for follow up with Marianna HERNANDEZ. Patient accompanied by her daughter Bridget. Medications and allergies reviewed with patient.     Patient compliant on her anastrozole, she denies joint pain or hot flashes, nausea, vomiting, diarrhea, constipation or difficulty eating.     Labs drawn in office today.   Per orders patient to follow up in office. She is to schedule a CT, DEXA and right mammogram.     Patient verbalized understanding using the teach back method, no further questions at this time.

## 2025-02-10 ENCOUNTER — HOSPITAL ENCOUNTER (OUTPATIENT)
Dept: RADIOLOGY | Facility: HOSPITAL | Age: 80
Discharge: HOME | End: 2025-02-10
Payer: MEDICARE

## 2025-02-10 VITALS — WEIGHT: 150 LBS | BODY MASS INDEX: 25.61 KG/M2 | HEIGHT: 64 IN

## 2025-02-10 DIAGNOSIS — Z12.31 SCREENING MAMMOGRAM FOR BREAST CANCER: ICD-10-CM

## 2025-02-10 DIAGNOSIS — Z78.0 MENOPAUSE: ICD-10-CM

## 2025-02-10 DIAGNOSIS — C34.90 MALIGNANT NEOPLASM OF LUNG, UNSPECIFIED LATERALITY, UNSPECIFIED PART OF LUNG (MULTI): ICD-10-CM

## 2025-02-10 DIAGNOSIS — M81.0 OSTEOPOROSIS, UNSPECIFIED OSTEOPOROSIS TYPE, UNSPECIFIED PATHOLOGICAL FRACTURE PRESENCE: ICD-10-CM

## 2025-02-10 DIAGNOSIS — Z85.3 PERSONAL HISTORY OF MALIGNANT NEOPLASM OF BREAST: ICD-10-CM

## 2025-02-10 PROCEDURE — 77080 DXA BONE DENSITY AXIAL: CPT

## 2025-02-10 PROCEDURE — 77067 SCR MAMMO BI INCL CAD: CPT | Mod: RIGHT SIDE | Performed by: RADIOLOGY

## 2025-02-10 PROCEDURE — 77063 BREAST TOMOSYNTHESIS BI: CPT | Mod: RIGHT SIDE | Performed by: RADIOLOGY

## 2025-02-10 PROCEDURE — 77080 DXA BONE DENSITY AXIAL: CPT | Performed by: RADIOLOGY

## 2025-02-10 PROCEDURE — 77061 BREAST TOMOSYNTHESIS UNI: CPT | Mod: RT

## 2025-02-10 PROCEDURE — 2550000001 HC RX 255 CONTRASTS

## 2025-02-10 PROCEDURE — 71260 CT THORAX DX C+: CPT

## 2025-02-10 RX ADMIN — IOHEXOL 75 ML: 350 INJECTION, SOLUTION INTRAVENOUS at 15:28

## 2025-02-11 ENCOUNTER — TELEPHONE (OUTPATIENT)
Dept: HEMATOLOGY/ONCOLOGY | Facility: CLINIC | Age: 80
End: 2025-02-11
Payer: MEDICARE

## 2025-02-11 NOTE — TELEPHONE ENCOUNTER
Discussed the following results with patient's daughter (isabelle):     XR DEXA bone density  Narrative: Interpreted By:  Angelica Aburto,   STUDY:  DEXA BONE DENSITY2/10/2025 3:19 pm      INDICATION:  Signs/Symptoms:menoapuse / hx of osteoporosis. The patient is a 78 y/o  year old F. Postmenopausal denying hormonal replacement therapy      ,Z85.3 Personal history of malignant neoplasm of breast,C34.90  Malignant neoplasm of unspecified part of unspecified bronchus or  lung (Multi),Z78.0 Asymptomatic menopausal state,M81.0 Age-related  osteoporosis without current pathological fracture      COMPARISON:  None.      ACCESSION NUMBER(S):  [id number]      ORDERING CLINICIAN:  INA CHING      TECHNIQUE:  DEXA BONE DENSITY      FINDINGS:  SPINE L1-L4  Bone Mineral Density: 0.815 g/cm2  T-Score -2.1  Z-Score 0.5  Bone Mineral Density change vs baseline:  Not reported  Bone Mineral Density change vs previous: Not reported      LEFT FEMUR -TOTAL  Bone Mineral Density: 0.358 g/cm2  T-Score -4.8   Z-Score  -2.8  Bone Mineral Density change vs baseline: Not reported  Bone Mineral Density change vs previous: Not reported      LEFT FEMUR -NECK  Bone Mineral Density: 0.224 g/cm2  T-Score -5.6  Z-Score -3.4  Bone Mineral Density change vs baseline: Not reported  Bone Mineral Density change vs previous: Not reported          World Health Organization (WHO) criteria for post-menopausal,   Women:  Normal:         T-score at or above -1 SD  Osteopenia:   T-score between -1 and -2.5 SD  Osteoporosis: T-score at or below -2.5 SD          10-year Fracture Risk:  Major Osteoporotic Fracture  62 % without prior fracture and 72% with  prior fracture Hip Fracture                        48 % without prior  fracture and 57% with prior fracture      Note:  If no FRAX score is reported, it is because:  Some T-score for Spine Total or Hip Total or Femoral Neck at or below  -2.5      Impression: DEXA:  According to World Health  Organization criteria,  classification is osteoporosis.      Followup recommended in two years or sooner as clinically warranted.      All images and detailed analysis are available on the  Radiology  PACS.      MACRO:  None      Signed by: Angelica Aburto 2/10/2025 3:47 PM  Dictation workstation:   [id number]       Discussed fosamax 70 mg q weekly including purpose and side effects- she will reach out to her dentisit for evaluation before starting. Call me back once completed. No further questions at this time.

## 2025-02-13 ENCOUNTER — TELEPHONE (OUTPATIENT)
Dept: HEMATOLOGY/ONCOLOGY | Facility: CLINIC | Age: 80
End: 2025-02-13
Payer: MEDICARE

## 2025-02-13 NOTE — TELEPHONE ENCOUNTER
Called patients daughter to review following results:   BI mammo right screening tomosynthesis  Status: Final result     PACS Images     Show images for BI mammo right screening tomosynthesis  In Basket Actions    Done  Result Mgmt     View in In Basket  Signed by    Signed Time Phone Pager   Mary Troncoso MD 2/13/2025 08:44 [phone]     Exam Information    Status Exam Begun Exam Ended   Final 2/10/2025 14:23 2/10/2025 14:50     Study Result    Narrative & Impression   Interpreted By:  Mary Troncoso,   STUDY:  BI MAMMO RIGHT SCREENING TOMOSYNTHESIS;  2/10/2025 2:50 pm      ACCESSION NUMBER(S):  [id number]      ORDERING CLINICIAN:  INA CHING      INDICATION:  Screening. Left mastectomy. Interval 30 pounds weight loss.      ,Z85.3 Personal history of malignant neoplasm of breast,C34.90  Malignant neoplasm of unspecified part of unspecified bronchus or  lung (Multi),Z12.31 Encounter for screening mammogram for malignant  neoplasm of breast      COMPARISON:  05/17/2023, 09/01/2015      FINDINGS:  2D and tomosynthesis images were reviewed at 1 mm slice thickness.      Density:  There are scattered areas of fibroglandular density.      The parenchymal pattern is consistent with reduction surgery. No  suspicious masses or calcifications are identified.      IMPRESSION:  1. No mammographic evidence of malignancy.  2. The patient reports 30 pounds weight loss. It is uncertain if this  is unintentional. Clinical correlation is recommended.      BI-RADS CATEGORY:  BI-RADS Category:  2 Benign.  Recommendation:  Annual Screening.  Recommended Date:  1 Year.  Laterality:  Right.       CT chest w IV contrast  Status: Final result     PACS Images     Show images for CT chest w IV contrast  In Basket Actions    Done  Result Mgmt     View in In Basket  Signed by    Signed Time Phone Pager   Gabrielle Duff MD 2/12/2025 10:55 [phone]     Exam Information    Status Exam Begun Exam Ended   Final  2/10/2025 15:25 2/10/2025 15:40     Study Result    Narrative & Impression   Interpreted By:  aGbrielle Duff,   STUDY:  CT CHEST W IV CONTRAST;  2/10/2025 3:40 pm      INDICATION:  Signs/Symptoms:follow up chest mass / hx of breast cancer / restaging  breast cancer.      COMPARISON:  01/03/2024      ACCESSION NUMBER(S):  [id number]      ORDERING CLINICIAN:  INA CHING      TECHNIQUE:  Helical data acquisition of the chest was obtained following  administration of 75 cc of Omniqaque 350. Images were reformatted in  axial, coronal, and sagittal planes. MIP images were created and  reviewed      FINDINGS:  POTENTIAL LIMITATIONS OF THE STUDY: None      HEART AND VESSELS:      Main pulmonary artery and its branches are normal in caliber.      The thoracic aorta is of normal course and caliber with vascular  calcifications.      Coronary artery calcifications are seen.The study is not optimized  for evaluation of coronary arteries.      The heart is not enlarged.      No evidence of pericardial effusion.      MEDIASTINUM AND SHO, LOWER NECK AND AXILLA:  The visualized thyroid gland is within normal limits.      No evidence of thoracic lymphadenopathy by CT criteria.      Thickening of the esophageal wall is again noted.      Mass lesion is again seen in the aortopulmonary window which measures  1.4 x 4.2 cm. It is essentially unchanged allowing for the difference  in measurement.      LUNGS AND AIRWAYS:  The trachea and central airways are patent.      No consolidation or pleural effusion is noted. Bibasilar  bronchiectasis is again noted.      Subpleural reticulations are again seen in the anterior aspect of the  left mid lung which may be related to radiation therapy changes these  have not significantly changed.      UPPER ABDOMEN:  The liver is diffusely hypodense, which most likely is related to  fatty infiltration. Gallstones are identified.      CHEST WALL AND OSSEOUS STRUCTURES:      Posttreatment changes  are again seen in the left breast with surgical  clips and skin thickening. There are no suspicious osseous lesions.  Multilevel degenerative changes are present      IMPRESSION:  1.  Redemonstration of 4.2 cm lesion in AP window which has not  significantly changed.  2. Posttreatment changes in the left breast.  3. Redemonstration of mild esophageal wall thickening.  4. Bibasilar bronchiectasis and subpleural reticulations in the  anterior aspect of the left midlung, which are unchanged.  5. Fatty infiltration of the liver.  6. Cholelithiasis.         Both look stable - continue yearly as planned. Will monitor liver function given appearance of fatty infiltrated on this scan. Will update once seen by a dentist in order to start Fosamax. No further questions or concerns at this time.

## 2025-05-05 DIAGNOSIS — C50.919 MALIGNANT NEOPLASM OF BREAST IN FEMALE, ESTROGEN RECEPTOR POSITIVE, UNSPECIFIED LATERALITY, UNSPECIFIED SITE OF BREAST: ICD-10-CM

## 2025-05-05 DIAGNOSIS — Z17.0 MALIGNANT NEOPLASM OF BREAST IN FEMALE, ESTROGEN RECEPTOR POSITIVE, UNSPECIFIED LATERALITY, UNSPECIFIED SITE OF BREAST: ICD-10-CM

## 2025-05-05 RX ORDER — ANASTROZOLE 1 MG/1
1 TABLET ORAL
Qty: 90 TABLET | Refills: 3 | Status: SHIPPED | OUTPATIENT
Start: 2025-05-05 | End: 2026-05-05

## 2025-05-05 NOTE — TELEPHONE ENCOUNTER
Reason for Conversation  Med Refill    Background   Daughter requesting Anastrozole rx to be sent to [facility] pt      Disposition   No disposition on file.

## 2025-07-24 ENCOUNTER — APPOINTMENT (OUTPATIENT)
Dept: HEMATOLOGY/ONCOLOGY | Facility: CLINIC | Age: 80
End: 2025-07-24
Payer: MEDICARE

## 2025-07-30 ENCOUNTER — APPOINTMENT (OUTPATIENT)
Dept: HEMATOLOGY/ONCOLOGY | Facility: CLINIC | Age: 80
End: 2025-07-30
Payer: MEDICARE